# Patient Record
Sex: MALE | Race: WHITE
[De-identification: names, ages, dates, MRNs, and addresses within clinical notes are randomized per-mention and may not be internally consistent; named-entity substitution may affect disease eponyms.]

---

## 2019-06-07 ENCOUNTER — HOSPITAL ENCOUNTER (OUTPATIENT)
Dept: HOSPITAL 26 - MMU | Age: 59
Discharge: HOME | End: 2019-06-07
Attending: INTERNAL MEDICINE
Payer: COMMERCIAL

## 2019-06-07 VITALS — BODY MASS INDEX: 34.36 KG/M2 | HEIGHT: 70 IN | WEIGHT: 240 LBS

## 2019-06-07 DIAGNOSIS — Z98.890: ICD-10-CM

## 2019-06-07 DIAGNOSIS — K21.9: ICD-10-CM

## 2019-06-07 DIAGNOSIS — Z12.11: Primary | ICD-10-CM

## 2019-06-07 DIAGNOSIS — Z53.8: ICD-10-CM

## 2019-06-07 DIAGNOSIS — I10: ICD-10-CM

## 2019-06-07 DIAGNOSIS — Z88.1: ICD-10-CM

## 2019-06-07 DIAGNOSIS — Z95.818: ICD-10-CM

## 2019-06-07 DIAGNOSIS — Z86.711: ICD-10-CM

## 2019-07-07 ENCOUNTER — HOSPITAL ENCOUNTER (INPATIENT)
Dept: HOSPITAL 26 - MED | Age: 59
Discharge: LEFT BEFORE DISCHARGE | DRG: 385 | End: 2019-07-07
Payer: COMMERCIAL

## 2019-07-07 VITALS — BODY MASS INDEX: 34.36 KG/M2 | HEIGHT: 70 IN | WEIGHT: 240 LBS

## 2019-07-07 VITALS — SYSTOLIC BLOOD PRESSURE: 134 MMHG | DIASTOLIC BLOOD PRESSURE: 94 MMHG

## 2019-07-07 VITALS — SYSTOLIC BLOOD PRESSURE: 116 MMHG | DIASTOLIC BLOOD PRESSURE: 60 MMHG

## 2019-07-07 DIAGNOSIS — E78.5: ICD-10-CM

## 2019-07-07 DIAGNOSIS — Z88.1: ICD-10-CM

## 2019-07-07 DIAGNOSIS — E78.00: ICD-10-CM

## 2019-07-07 DIAGNOSIS — Z86.711: ICD-10-CM

## 2019-07-07 DIAGNOSIS — R22.0: Primary | ICD-10-CM

## 2019-07-07 DIAGNOSIS — I10: ICD-10-CM

## 2019-07-07 DIAGNOSIS — I25.2: ICD-10-CM

## 2019-07-07 DIAGNOSIS — Z86.718: ICD-10-CM

## 2019-07-07 LAB
ALBUMIN FLD-MCNC: 2.8 G/DL (ref 3.4–5)
ANION GAP SERPL CALCULATED.3IONS-SCNC: 11.9 MMOL/L (ref 8–16)
AST SERPL-CCNC: 21 U/L (ref 15–37)
BASOPHILS # BLD AUTO: 0 K/UL (ref 0–0.22)
BASOPHILS NFR BLD AUTO: 0.2 % (ref 0–2)
BILIRUB SERPL-MCNC: 0.6 MG/DL (ref 0–1)
BUN SERPL-MCNC: 15 MG/DL (ref 7–18)
CHLORIDE SERPL-SCNC: 99 MMOL/L (ref 98–107)
CO2 SERPL-SCNC: 26.3 MMOL/L (ref 21–32)
CREAT SERPL-MCNC: 1.4 MG/DL (ref 0.7–1.3)
EOSINOPHIL # BLD AUTO: 0.1 K/UL (ref 0–0.4)
EOSINOPHIL NFR BLD AUTO: 0.8 % (ref 0–4)
ERYTHROCYTE [DISTWIDTH] IN BLOOD BY AUTOMATED COUNT: 15.4 % (ref 11.6–13.7)
GFR SERPL CREATININE-BSD FRML MDRD: 67 ML/MIN (ref 90–?)
GLUCOSE SERPL-MCNC: 129 MG/DL (ref 74–106)
HCT VFR BLD AUTO: 39 % (ref 36–52)
HGB BLD-MCNC: 13.3 G/DL (ref 12–18)
LYMPHOCYTES # BLD AUTO: 1.3 K/UL (ref 2–11.5)
LYMPHOCYTES NFR BLD AUTO: 15.6 % (ref 20.5–51.1)
MCH RBC QN AUTO: 29 PG (ref 27–31)
MCHC RBC AUTO-ENTMCNC: 34 G/DL (ref 33–37)
MCV RBC AUTO: 84.8 FL (ref 80–94)
MONOCYTES # BLD AUTO: 0.7 K/UL (ref 0.8–1)
MONOCYTES NFR BLD AUTO: 8 % (ref 1.7–9.3)
NEUTROPHILS # BLD AUTO: 6.4 K/UL (ref 1.8–7.7)
NEUTROPHILS NFR BLD AUTO: 75.4 % (ref 42.2–75.2)
PLATELET # BLD AUTO: 248 K/UL (ref 140–450)
POTASSIUM SERPL-SCNC: 4.2 MMOL/L (ref 3.5–5.1)
PROTHROMBIN TIME: 17.5 SECS (ref 10.8–13.4)
RBC # BLD AUTO: 4.6 MIL/UL (ref 4.2–6.1)
SODIUM SERPL-SCNC: 133 MMOL/L (ref 136–145)
WBC # BLD AUTO: 8.5 K/UL (ref 4.8–10.8)

## 2019-07-07 NOTE — NUR
PT IN BED C/O OF FEELING VERY UNCOMFORTABLE HERE, PT SAID THAT HE HAS SINUS AND CONGESTION 
ISSUES AND THAT BETWEEN THE SLEEP APNEA AND CONGESTION, PT WANTS TO LEAVE AMA. RISKS AND 
BENEFITS EXPLAINED. PT LEFT WITH BELONGS IN HAND.

## 2019-07-07 NOTE — NUR
PT IN BED SIDE RAILS UP X2. PT IS ABLE TO AMBULATE INDEPENDENTLY. HE IS AOX4 WITH SKIN 
INTACT EXCEPT FOR RT F/A 20G  AND HE HAS FACIAL SWELLING TO LEFT SIDE OF FACE. V/S AS 
FOLLOWS T 98.7 P 82 R 18 B/P 122/77 02 97% ON ROOM AIR.PT RUNNING CLINDAMYCIN.

## 2019-07-07 NOTE — NUR
PT BIB SELF WITH C/O PAIN ATH THE LEFT JAW, UNDER LFT EAR. SITE IS SWOLLEN, 
PAINFUL TO TOUCH.PT  STATES HAD GONE TO [facility] WITH PAIN AT HIS 
LFT JAW, WAS DIAGNOSED WITYH  LEFT SALIVARY GLAND INFECTION, TOOK AUGMENTIN, 
DID NOT GET RELIEVED.  PAIN 8/10. TEM 99.3. STATES TO HAVE TAKEN TTYELENOL AT 
HOME THIS AFTERNOON BEFORE COMING TO ER FOR HIS PAIN. PT WENT TO DENTIST THREE 
WEEKS AGO, WAS TOLD THAT DENTAL WORK NEEDS TO BE DONE.

HX: DVT, HEART ATTACK, HTN, HIGH CHOLESTROL. 

TX: WARFARIN, BRILLNTA, AMLODIPINE, CARDILOL, AONTELUKAST, PROTONIX, 
ATROVASTATIN, ASPIRIN, QVAR